# Patient Record
Sex: FEMALE | Race: OTHER | HISPANIC OR LATINO
[De-identification: names, ages, dates, MRNs, and addresses within clinical notes are randomized per-mention and may not be internally consistent; named-entity substitution may affect disease eponyms.]

---

## 2024-07-17 ENCOUNTER — DASHBOARD ENCOUNTERS (OUTPATIENT)
Age: 49
End: 2024-07-17

## 2024-07-17 ENCOUNTER — LAB OUTSIDE AN ENCOUNTER (OUTPATIENT)
Dept: URBAN - METROPOLITAN AREA CLINIC 35 | Facility: CLINIC | Age: 49
End: 2024-07-17

## 2024-07-17 ENCOUNTER — OFFICE VISIT (OUTPATIENT)
Dept: URBAN - METROPOLITAN AREA CLINIC 35 | Facility: CLINIC | Age: 49
End: 2024-07-17
Payer: COMMERCIAL

## 2024-07-17 VITALS
HEART RATE: 70 BPM | SYSTOLIC BLOOD PRESSURE: 116 MMHG | OXYGEN SATURATION: 97 % | WEIGHT: 136 LBS | BODY MASS INDEX: 25.68 KG/M2 | DIASTOLIC BLOOD PRESSURE: 76 MMHG | HEIGHT: 61 IN

## 2024-07-17 DIAGNOSIS — K59.09 CHANGE IN BOWEL MOVEMENTS INTERMITTENT CONSTIPATION. URGENCY IN THE MORNING.: ICD-10-CM

## 2024-07-17 DIAGNOSIS — R19.7 ACUTE DIARRHEA: ICD-10-CM

## 2024-07-17 DIAGNOSIS — R19.06 EPIGASTRIC FULLNESS: ICD-10-CM

## 2024-07-17 DIAGNOSIS — R10.12 LEFT UPPER QUADRANT PAIN: ICD-10-CM

## 2024-07-17 PROBLEM — 82934008: Status: ACTIVE | Noted: 2024-07-17

## 2024-07-17 PROCEDURE — 99204 OFFICE O/P NEW MOD 45 MIN: CPT | Performed by: STUDENT IN AN ORGANIZED HEALTH CARE EDUCATION/TRAINING PROGRAM

## 2024-07-17 RX ORDER — DOCUSATE SODIUM 100 MG/1
1 CAPSULE AS NEEDED CAPSULE ORAL ONCE A DAY
Status: ACTIVE | COMMUNITY

## 2024-07-17 RX ORDER — ALPRAZOLAM 0.5 MG/1
1 TABLET TABLET ORAL TWICE A DAY
Status: ACTIVE | COMMUNITY

## 2024-07-17 NOTE — HPI-TODAY'S VISIT:
Labs 7- Pregnancy screen, urine negative Lipase 25 Sodium 138, creatinine 0.7, AST 21, ALT 26, alkaline phosphatase 64, total bilirubin 0.4 White blood cell count 12.1, hemoglobin 13.3, platelet count 371

## 2024-07-17 NOTE — HPI-TODAY'S VISIT:
49 year old patient presents today for constipation and bloating, ED follow-up. She reports was having acute onset pain in rectum area. Reports had not had used bathroom for 2 days. Had CT below. She also had urinary retention symptoms at that time. Used enema in ED, had BM eventually, good BM.No blood in stool. Pain improved. D. She was mounjaro for 6-7 mo in past, with slower BM, and took dulcolax PRN. She is off mounjaro 1-2 mo. Has typically pattern 1 BM per day but incomplete evacuation. Since ER, more skinny appearing stool. She is taking colace since ER. Reports urinating ok without further issue. Reports colonoscopy was w/o Dr. Neelam Gutierrez in [place] - 1 year ago. Reports no findings, reports polyps prior colonoscopy. She also notices more sharp pain in LUQ. More cramps generalized in abdomen. Feels fullness in epigastrium after eating. No nausea/emesis.   Outside labs and imaging reviewed  [facility] CT 7-12-24 IMPRESSION: 1.  Large rectal stool ball with perirectal fat stranding. Finding may be seen in setting of stercoral colitis. 2.  Multiple fluid-filled loops of small bowel and air-fluid levels throughout the colon, nonspecific, may be seen in setting of diarrheal illness. 3.  Heterogeneous enhancement of the uterus with multiple rounded intramural and submucosal hypodensities which may reflect fibroids. This finding may be further characterized with pelvic ultrasound as clinically warranted. 4.  Punctate nonobstructive right renal calculus.

## 2024-07-30 ENCOUNTER — TELEPHONE ENCOUNTER (OUTPATIENT)
Dept: URBAN - METROPOLITAN AREA SURGERY CENTER 8 | Facility: SURGERY CENTER | Age: 49
End: 2024-07-30

## 2024-08-15 ENCOUNTER — CLAIMS CREATED FROM THE CLAIM WINDOW (OUTPATIENT)
Dept: URBAN - METROPOLITAN AREA CLINIC 4 | Facility: CLINIC | Age: 49
End: 2024-08-15
Payer: COMMERCIAL

## 2024-08-15 ENCOUNTER — CLAIMS CREATED FROM THE CLAIM WINDOW (OUTPATIENT)
Dept: URBAN - METROPOLITAN AREA SURGERY CENTER 8 | Facility: SURGERY CENTER | Age: 49
End: 2024-08-15
Payer: COMMERCIAL

## 2024-08-15 DIAGNOSIS — R10.84 ABDOMINAL CRAMPING, GENERALIZED: ICD-10-CM

## 2024-08-15 DIAGNOSIS — R19.4 ALTERATION IN BOWEL ELIMINATION: ICD-10-CM

## 2024-08-15 DIAGNOSIS — K31.7 POLYP OF STOMACH AND DUODENUM: ICD-10-CM

## 2024-08-15 DIAGNOSIS — K31.89 OTHER DISEASES OF STOMACH AND DUODENUM: ICD-10-CM

## 2024-08-15 DIAGNOSIS — R10.12 ABDOMINAL BURNING SENSATION IN LEFT UPPER QUADRANT: ICD-10-CM

## 2024-08-15 DIAGNOSIS — R10.84 GENERALIZED ABDOMINAL PAIN: ICD-10-CM

## 2024-08-15 DIAGNOSIS — R19.4 CHANGE IN BOWEL HABITS: ICD-10-CM

## 2024-08-15 PROCEDURE — 88305 TISSUE EXAM BY PATHOLOGIST: CPT | Performed by: PATHOLOGY

## 2024-08-15 PROCEDURE — 88312 SPECIAL STAINS GROUP 1: CPT | Performed by: PATHOLOGY

## 2024-08-15 PROCEDURE — 43239 EGD BIOPSY SINGLE/MULTIPLE: CPT | Performed by: STUDENT IN AN ORGANIZED HEALTH CARE EDUCATION/TRAINING PROGRAM

## 2024-08-15 PROCEDURE — 45378 DIAGNOSTIC COLONOSCOPY: CPT | Performed by: STUDENT IN AN ORGANIZED HEALTH CARE EDUCATION/TRAINING PROGRAM

## 2024-08-15 PROCEDURE — 00813 ANES UPR LWR GI NDSC PX: CPT | Performed by: NURSE ANESTHETIST, CERTIFIED REGISTERED

## 2024-08-15 RX ORDER — ALPRAZOLAM 0.5 MG/1
1 TABLET TABLET ORAL TWICE A DAY
Status: ACTIVE | COMMUNITY

## 2024-08-15 RX ORDER — DOCUSATE SODIUM 100 MG/1
1 CAPSULE AS NEEDED CAPSULE ORAL ONCE A DAY
Status: ACTIVE | COMMUNITY

## 2024-09-04 ENCOUNTER — TELEPHONE ENCOUNTER (OUTPATIENT)
Dept: URBAN - METROPOLITAN AREA CLINIC 35 | Facility: CLINIC | Age: 49
End: 2024-09-04

## 2024-09-04 ENCOUNTER — OFFICE VISIT (OUTPATIENT)
Dept: URBAN - METROPOLITAN AREA CLINIC 35 | Facility: CLINIC | Age: 49
End: 2024-09-04
Payer: COMMERCIAL

## 2024-09-04 VITALS
BODY MASS INDEX: 25.3 KG/M2 | DIASTOLIC BLOOD PRESSURE: 76 MMHG | HEIGHT: 61 IN | OXYGEN SATURATION: 99 % | HEART RATE: 64 BPM | SYSTOLIC BLOOD PRESSURE: 106 MMHG | WEIGHT: 134 LBS

## 2024-09-04 DIAGNOSIS — R10.84 GENERALIZED ABDOMINAL PAIN: ICD-10-CM

## 2024-09-04 DIAGNOSIS — R09.A2 GLOBUS SENSATION: ICD-10-CM

## 2024-09-04 DIAGNOSIS — R12 HEARTBURN: ICD-10-CM

## 2024-09-04 DIAGNOSIS — Z12.11 SCREENING FOR COLON CANCER: ICD-10-CM

## 2024-09-04 DIAGNOSIS — K59.04 CHRONIC IDIOPATHIC CONSTIPATION: ICD-10-CM

## 2024-09-04 PROCEDURE — 99214 OFFICE O/P EST MOD 30 MIN: CPT | Performed by: STUDENT IN AN ORGANIZED HEALTH CARE EDUCATION/TRAINING PROGRAM

## 2024-09-04 RX ORDER — PANTOPRAZOLE SODIUM 40 MG/1
1 TABLET TABLET, DELAYED RELEASE ORAL ONCE A DAY
Qty: 90 TABLET | Refills: 1 | OUTPATIENT
Start: 2024-09-04

## 2024-09-04 RX ORDER — ALPRAZOLAM 0.5 MG/1
1 TABLET TABLET ORAL TWICE A DAY
Status: ACTIVE | COMMUNITY

## 2024-09-04 RX ORDER — HYOSCYAMINE SULFATE 0.125 MG
1 TABLET ON THE TONGUE AND ALLOW TO DISSOLVE AS NEEDED TABLET,DISINTEGRATING ORAL DAILY
Qty: 30 | Refills: 1 | OUTPATIENT
Start: 2024-09-04 | End: 2024-11-03

## 2024-09-04 NOTE — HPI-TODAY'S VISIT:
49 year old female presents for follow up for constipation and post procedures. Since last visit taking linzess 145 mcg daily. 3 BM per week. Feeling incomplete evacuation still. No blood. No abdominal pain.Dull pressure in rectal area when having BM.  Feels intermittent sticking sensation in throat when swallows./mucus sensation, she reports forgot to mention last visit. Since procedure, having noted more typical reflux symptoms/burning. Fullness sensation in epigastrium is improved though. She remains off mounjaro.   8.15.24 Colon report shows: The examined portion of the ileum was normal. Retroflexion performed in the right colon. The cecum, ascending colon, transverse colon, descending colon, sigmoid colon and rectum are normal. No specimens collected.   EGD: Normal esophagus. Multiple gastric polyps. Biopsied. Biopsies were taken with a cold forceps for Helicobacter pylori testing. Normal duodenal bulb, second portion of the duodenum and third portion of the duodenum. Biopsies were taken with a cold forceps for evaluation of celiac disease.   Path: (A) Duodenum, Bulb;Second Part (D2), Biopsy (Cold Forceps): NO SIGNIFICANT ABNORMALITY. (B) Stomach, Antrum;Body, Biopsy (Cold Forceps): NO SIGNIFICANT ABNORMALITY. (C) Stomach, Body, Biopsy (Cold Forceps): FUNDIC GLAND POLYP. No Evidence of H. Pylori Organisms or Intestinal Metaplasia. Negative for Dysplasia or Malignancy.   Of last visit 7.17.24 49 year old patient presents today for constipation and bloating, ED follow-up. She reports was having acute onset pain in rectum area. Reports had not had used bathroom for 2 days. Had CT below. She also had urinary retention symptoms at that time. Used enema in ED, had BM eventually, good BM.No blood in stool. Pain improved. D. She was mounjaro for 6-7 mo in past, with slower BM, and took dulcolax PRN. She is off mounjaro 1-2 mo. Has typically pattern 1 BM per day but incomplete evacuation. Since ER, more skinny appearing stool. She is taking colace since ER. Reports urinating ok without further issue. Reports colonoscopy was w/o Dr. Neelam Gutierrez in [place] - 1 year ago. Reports no findings, reports polyps prior colonoscopy. She also notices more sharp pain in LUQ. More cramps generalized in abdomen. Feels fullness in epigastrium after eating. No nausea/emesis.   Outside labs and imaging reviewed  [facility] CT 7-12-24 IMPRESSION: 1.  Large rectal stool ball with perirectal fat stranding. Finding may be seen in setting of stercoral colitis. 2.  Multiple fluid-filled loops of small bowel and air-fluid levels throughout the colon, nonspecific, may be seen in setting of diarrheal illness. 3.  Heterogeneous enhancement of the uterus with multiple rounded intramural and submucosal hypodensities which may reflect fibroids. This finding may be further characterized with pelvic ultrasound as clinically warranted. 4.  Punctate nonobstructive right renal calculus.